# Patient Record
Sex: MALE | Race: WHITE
[De-identification: names, ages, dates, MRNs, and addresses within clinical notes are randomized per-mention and may not be internally consistent; named-entity substitution may affect disease eponyms.]

---

## 2017-05-06 ENCOUNTER — HOSPITAL ENCOUNTER (EMERGENCY)
Dept: HOSPITAL 25 - ED | Age: 27
Discharge: LEFT BEFORE DISCHARGE | End: 2017-05-06
Payer: COMMERCIAL

## 2017-05-06 VITALS — SYSTOLIC BLOOD PRESSURE: 145 MMHG | DIASTOLIC BLOOD PRESSURE: 80 MMHG

## 2017-05-06 DIAGNOSIS — R10.9: Primary | ICD-10-CM

## 2017-05-06 DIAGNOSIS — Z53.21: ICD-10-CM

## 2017-05-23 ENCOUNTER — HOSPITAL ENCOUNTER (EMERGENCY)
Dept: HOSPITAL 25 - UCEAST | Age: 27
Discharge: HOME | End: 2017-05-23
Payer: SELF-PAY

## 2017-05-23 VITALS — SYSTOLIC BLOOD PRESSURE: 136 MMHG | DIASTOLIC BLOOD PRESSURE: 70 MMHG

## 2017-05-23 DIAGNOSIS — Z88.2: ICD-10-CM

## 2017-05-23 DIAGNOSIS — S90.511A: Primary | ICD-10-CM
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
## 2017-05-23 DIAGNOSIS — E07.9: ICD-10-CM

## 2017-05-23 PROCEDURE — 99211 OFF/OP EST MAY X REQ PHY/QHP: CPT

## 2017-05-23 PROCEDURE — G0463 HOSPITAL OUTPT CLINIC VISIT: HCPCS

## 2017-05-23 NOTE — UC
Lower Extremity/Ankle HPI





- HPI Summary


HPI Summary: 


28 y/o male presents to the urgent care  c/o injury to R ankle.  Pt states he 

was at work today at [employer] and was kicked  by child with the child RT heel 

around 2:10pm, age 7, approx 130lbs, pt states.  Pt states when rotates inward, 

or when putting weight on his Pain is 4/10. Pt applied ice, but no took no pain 

medication.Patient denies any fever.


[ End ]





- History of Current Complaint


Chief Complaint: UCTrauma


Stated Complaint: ANKLE INJURY


Time Seen by Provider: 05/23/17 16:11


Onset/Duration: Sudden Onset


Severity Initially: Moderate


Severity Currently: Mild


Pain Intensity: 4


Aggravating Factor(s): Standing


Alleviating Factor(s): Rest, Ice


Related History: Occupational Injury





- Allergies/Home Medications


Allergies/Adverse Reactions: 


 Allergies











Allergy/AdvReac Type Severity Reaction Status Date / Time


 


Sulfa Drugs Allergy Severe Swelling Verified 05/23/17 15:42














PMH/Surg Hx/FS Hx/Imm Hx


Endocrine History Of: Reports: Thyroid Disease


   Denies: Diabetes, Hyperthyroidism, Hypothyroidism, Dyslipidemia


Cardiovascular History Of: 


   Denies: Cardiac Disorders, Hypertension, Pacemaker/ICD, Myocardial Infarction

, Congestive Heart Failure, Atrial Fibrillation, Deep Vein Thrombosis, Bleeding 

Disorders


Respiratory History Of: 


   Denies: COPD, Asthma, Bronchitis, Pneumonia, Pulmonary Embolism


GI/ History Of: 


   Denies: Gastroesophageal Reflux, Ulcer, Gastrointestinal Bleed, Gall Bladder 

Disease, Kidney Stones, Diverticulitis, Renal Disease, Urosepsis


Neurological History Of: 


   Denies: TIA, CVA, Dementia, Seizures, Migraine


Psychological History Of: 


   Denies: Anxiety, Depression, Bipolar Disorder, Schizophrenia, Post Traumatic 

Stress Disorder


Cancer History Of: 


   Denies: Lung Cancer, Colorectal Cancer, Breast Cancer, Prostate Cancer, 

Cervical Cancer


Other History Of: 


   Negative For: HIV, Hepatitis B, Hepatitis C





- Surgical History


Surgical History: Yes


Surgery Procedure, Year, and Place: CHOLECYSTECTOMY-[facility].  RIGHT SHOULDER SURGERY

- [facility].  LEFT SHOULDER SURGERY- [facility].  LT WRIST SURGERY





- Family History


Known Family History: Positive: Renal Disease - Stones in mother.


   Negative: Cardiac Disease, Hypertension





- Social History


Alcohol Use: Rare


Substance Use Type: None


Smoking Status (MU): Never Smoked Tobacco


Have You Smoked in the Last Year: No





- Immunization History


Most Recent Influenza Vaccination: none


Most Recent Tetanus Shot: UTD





Review of Systems


Constitutional: Negative


Skin: Bruising - scratch above the dorsal side of the RT ankle


Eyes: Negative


ENT: Negative


Respiratory: Negative


Cardiovascular: Negative


Gastrointestinal: Negative


Genitourinary: Negative


Musculoskeletal: Decreased ROM - mild decrease ROM of the RT ankle


Neurological: Negative


Psychological: Negative


All Other Systems Reviewed And Are Negative: Yes





Physical Exam


Triage Information Reviewed: Yes


Appearance: Well-Appearing, No Pain Distress, Well-Nourished


Vital Signs: 


 Initial Vital Signs











Temp  98.4 F   05/23/17 15:43


 


Pulse  76   05/23/17 15:43


 


Resp  18   05/23/17 15:43


 


BP  136/70   05/23/17 15:43


 


Pulse Ox  99   05/23/17 15:43











Vital Signs Reviewed: Yes


Eye Exam: Normal


ENT Exam: Normal


Neck exam: Normal


Respiratory Exam: Normal


Respiratory: Positive: Normal breath sounds


Cardiovascular: Positive: RRR, Pulses Normal


Abdomen Description: Positive: Nontender, No Organomegaly


Musculoskeletal: Positive: Strength Intact, ROM Intact, Other: - RT ankle with 

a superficial abrasion above the ankle about 5 cm in lenth and 2cm in with. 

Mild erythema and mild swelling.  Mild tenderness on deep palpation.  Decrease 

ROM due to mild pain.  pulses intact.  positive sensation of foot and good 

capillary refill.


Neurological Exam: Normal


Psychological Exam: Normal


Skin Exam: Other - as above





Lower Extremity Course/Dx





- Course


Course Of Treatment: Superficial RT ankle abrassion.Worker's compensation 

report filled out for both patient and facility.  Patient advised to take 

ibuprofen 600mg PO prn, he has at home. To continue placing ice and elevated 

leg.





- Differential Dx/Diagnosis


Provider Diagnoses: Superficial skin abrassion above the Rt ankle.





Discharge





- Discharge Plan


Condition: Stable


Disposition: HOME


Patient Education Materials:  Abrasion (ED)


Referrals: 


Cailin Haines MD [Primary Care Provider] - 


Additional Instructions: 


please return to the urgent care is pain or swelling increases or unable to 

ambulate.  Take medication for pain and swelling as indicated.

## 2018-07-17 ENCOUNTER — HOSPITAL ENCOUNTER (EMERGENCY)
Dept: HOSPITAL 25 - UCEAST | Age: 28
Discharge: HOME | End: 2018-07-17
Payer: COMMERCIAL

## 2018-07-17 VITALS — SYSTOLIC BLOOD PRESSURE: 116 MMHG | DIASTOLIC BLOOD PRESSURE: 70 MMHG

## 2018-07-17 DIAGNOSIS — H66.92: Primary | ICD-10-CM

## 2018-07-17 DIAGNOSIS — Z88.2: ICD-10-CM

## 2018-07-17 DIAGNOSIS — H92.02: ICD-10-CM

## 2018-07-17 DIAGNOSIS — R09.81: ICD-10-CM

## 2018-07-17 PROCEDURE — G0463 HOSPITAL OUTPT CLINIC VISIT: HCPCS

## 2018-07-17 PROCEDURE — 99212 OFFICE O/P EST SF 10 MIN: CPT

## 2018-07-17 NOTE — UC
Ear Complaint HPI





- HPI Summary


HPI Summary: 





29 y/o male presents to the urgent care c/o left ear pain since Sunday morning 7

/15/2018. Pt report he had nasal congestion w/ clear discharge last week and 

then he started to feel his ear pain w/ mild subjective fever. Now pain is 

radiating to his throat w/ an swollen lymph node that is tender. Pt Took 

Ibuprofen PO 600mg to alleviate symptoms.  He feels fatigue and mild HA. Pain 

is 3/10 now. Pt denies dizziness, SOB, cough. chest pain, abdominal pain, N/V/D.








- History of Current Complaint


Chief Complaint: UCRespiratory


Stated Complaint: SORE THROAT,EAR PAIN


Time Seen by Provider: 07/17/18 11:25


Hx Obtained From: Patient


Onset/Duration: Gradual Onset, Lasting Days - 3 days, Still Present, Worse 

Since - last night


Severity Initially: Mild


Severity Currently: Moderate


Pain Intensity: 3


Pain Scale Used: 0-10 Numeric


Aggravating Factors: Nothing


Alleviating Factors: OTC Meds


Associated Signs/Symptoms: Positive: URI Symptoms.  Negative: Discharge, 

Hearing Loss





- Allergies/Home Medications


Allergies/Adverse Reactions: 


 Allergies











Allergy/AdvReac Type Severity Reaction Status Date / Time


 


Sulfa (Sulfonamide Allergy  Swelling Verified 04/10/18 13:17





Antibiotics)     














PMH/Surg Hx/FS Hx/Imm Hx


Previously Healthy: Yes


Endocrine History: Hypothyroidism - hashimotos thyroditis


Other History Of: 


   Negative For: HIV, Hepatitis B, Hepatitis C





- Surgical History


Surgical History: Yes


Surgery Procedure, Year, and Place: CHOLECYSTECTOMY-[facility].  RIGHT SHOULDER SURGERY

- [facility].  LEFT SHOULDER SURGERY- [facility].  LT WRIST SURGERY





- Family History


Known Family History: Positive: Renal Disease - Stones in mother.


   Negative: Cardiac Disease, Hypertension


Family History: Hypothyrodism





- Social History


Occupation: Employed Full-time


Lives: With Family


Alcohol Use: Rare


Substance Use Type: None


Smoking Status (MU): Never Smoked Tobacco


Have You Smoked in the Last Year: No





- Immunization History


Most Recent Influenza Vaccination: none


Most Recent Tetanus Shot: UTD





Review of Systems


Constitutional: Negative


Skin: Negative


Eyes: Negative


ENT: Sore Throat - left side, Ear Ache - left ear pain, Nasal Discharge - clear


Respiratory: Negative


Cardiovascular: Negative


Gastrointestinal: Negative


Genitourinary: Negative


Motor: Negative


Neurovascular: Negative


Musculoskeletal: Negative


Neurological: Headache


Psychological: Negative


Is Patient Immunocompromised?: No


All Other Systems Reviewed And Are Negative: Yes





Physical Exam





- Summary


Physical Exam Summary: 





Vital signs: reviewed


General: well developed, well nourished male sitting in the examining table w/o 

any apparent distress


Skin: Pink, warm and dry, no evidence of atopic dermatitis, psoriasis, 

seborrhea.


HEENT:


-Head: atraumatic, non tender; no scalp dermatitis.


-Eyes: sclera and conjunctiva clear, PERRLA, EOMI


-Ears: no pre- or postauricular lymphadenopathy, positive left anterior 

cervical lymphadenopathy; LF external ear canal is clear, LF TM injected w/ 

erythema, no light reflex and yellowis discharge. Rt TM WNL, LF external ear 

canal clear and LF TM WNL. TMs normal w/out bulging or retraction. Good light 

reflex. No fluid level,. No perforation.


-Nose/Face: erythematous and edematous nasal mucosa with clear rhinorrhea, no 

frontal or maxillary sinus tender to palpation.


-Mouth/Throat: Mucous membrane moist, posterior pharynx clear, no erythema or 

exudates.


Neck: supple, FROM, nontender, no lymphadenopathy, no meningismus.


Chest: Clear to auscultation, normal breath sounds


Abd: soft, Bowel sounds active, Nontender.


Back: no spinal or CVAT


Neuro: A&O x4, GCS 15, no focal neuro deficits, normal behavior for age.








Triage Information Reviewed: Yes


Vital Signs: 


 Initial Vital Signs











Temp  98.5 F   07/17/18 10:57


 


Pulse  52   07/17/18 10:57


 


Resp  16   07/17/18 10:57


 


BP  116/70   07/17/18 10:57


 


Pulse Ox  98   07/17/18 10:57














Ear Complaint Course/Dx





- Course


Course Of Treatment: 29 y/o male presents to the urgent care c/o left ear pain 

since Sunday morning 7/15/2018. Pt report he had nasal congestion w/ clear 

discharge last week and then he started to feel his ear pain w/ mild subjective 

fever. Now pain is radiating to his throat w/ an swollen lymph node that is 

tender. Pt Took Ibuprofen PO 600mg to alleviate symptoms.  He feels fatigue and 

mild HA. Pain is 3/10 now. Pt denies dizziness, SOB, cough. chest pain, 

abdominal pain, N/V/D.Hx obtained. Pt w/ left otitis media on examination. Pt 

Rx Amoxicillin PO. Pt Advised to continue w/ Ibuprofen for otalgia and fever. 

Also advised   if symptoms do not improve or worsen to return to the urgent 

care or f/u with PCP  for further management. Mother  understood and agreed 

with D/C  instructions.





- Differential Dx/Diagnosis


Differential Diagnosis/HQI/PQRI: Cerumen Impaction, Otitis Externa, Perforated 

TM


Provider Diagnoses: 1- Left otitis media.  2- Otalgia





Discharge





- Sign-Out/Discharge


Documenting (check all that apply): Patient Departure - D/c home





- Discharge Plan


Condition: Stable


Disposition: HOME


Prescriptions: 


Amoxicillin PO (*) [Amoxicillin 875 MG (*)] 875 mg PO BID #14 tab


Patient Education Materials:  Ear Infection (ED)


Forms:  *Work Release


Referrals: 


Cailin Haines MD [Primary Care Provider] - 


Additional Instructions: 


1- Please take the full course of the antibiotic to avoid resistance.


2-Please take ibuprofen PO q6-8hrs prn as instructed after meals to alleviate 

pain and swelling. Increase fluid intake, eat well, rest and avoid strenuous 

exercise


3-If symptoms do not improve or worsen please return to the urgent care or f/u 

with your PCP 3 days  for further evaluation and treatment.











- Billing Disposition and Condition


Condition: STABLE


Disposition: Home

## 2019-03-04 ENCOUNTER — HOSPITAL ENCOUNTER (EMERGENCY)
Dept: HOSPITAL 25 - UCEAST | Age: 29
Discharge: HOME | End: 2019-03-04
Payer: COMMERCIAL

## 2019-03-04 VITALS — DIASTOLIC BLOOD PRESSURE: 78 MMHG | SYSTOLIC BLOOD PRESSURE: 135 MMHG

## 2019-03-04 DIAGNOSIS — L02.412: Primary | ICD-10-CM

## 2019-03-04 DIAGNOSIS — Z86.14: ICD-10-CM

## 2019-03-04 PROCEDURE — 87205 SMEAR GRAM STAIN: CPT

## 2019-03-04 PROCEDURE — 87070 CULTURE OTHR SPECIMN AEROBIC: CPT

## 2019-03-04 PROCEDURE — 87641 MR-STAPH DNA AMP PROBE: CPT

## 2019-03-04 PROCEDURE — 10060 I&D ABSCESS SIMPLE/SINGLE: CPT

## 2019-03-04 PROCEDURE — 87077 CULTURE AEROBIC IDENTIFY: CPT

## 2019-03-04 PROCEDURE — 87640 STAPH A DNA AMP PROBE: CPT

## 2019-03-04 PROCEDURE — 87186 SC STD MICRODIL/AGAR DIL: CPT

## 2019-03-04 PROCEDURE — G0463 HOSPITAL OUTPT CLINIC VISIT: HCPCS

## 2019-03-04 PROCEDURE — 99212 OFFICE O/P EST SF 10 MIN: CPT

## 2019-03-04 NOTE — UC
Skin Complaint HPI





- HPI Summary


HPI Summary: 





29 y/o male presents to the urgent care c/o intermittent  painful lump in the 

left axilla for the past 2 weeks. Pt reports it has worsen for the past 2 days 

now with a red rash around it. He has applied warm compresses. Pain is 5/10, at 

touch. He denies any drainage and fever. Also denies Hx of MRSA. Pt can move 

his left shoulder w/o any difficulty. Pt denies SOB, chest pain, palpitations, 

abdominal pain, N/V/D. Pt Has been healthy.  








- History of Current Complaint


Chief Complaint: UCSkin


Time Seen by Provider: 03/04/19 14:17


Stated Complaint: UNDER ARM PAIN


Hx Obtained From: Patient


Onset/Duration: Gradual Onset, Lasting Weeks - 2 weeks, Still Present, Worse 

Since - 2 days


Skin Exposure Onset/Duration: Days Ago - 14 days


Timing: Constant


Onset Severity: Mild


Current Severity: Moderate


Pain Intensity: 5


Pain Scale Used: 0-10 Numeric


Location: Discrete - left axilla w/ with a  red rash around


Character: Swelling, Redness, Raised, Painful


Aggravating Factor(s): Touch


Alleviating Factor(s): Heat, OTC Meds


Associated Signs & Symptoms: Positive: Rash - left axilla painful lump, 

Tenderness.  Negative: Numbness, Fever, Chills, Drainage





- Allergy/Home Medications


Allergies/Adverse Reactions: 


 Allergies











Allergy/AdvReac Type Severity Reaction Status Date / Time


 


Sulfa (Sulfonamide Allergy  Swelling Verified 03/04/19 14:10





Antibiotics)     














PMH/Surg Hx/FS Hx/Imm Hx


Previously Healthy: Yes - Pt denies PMHX


Other History Of: 


   Negative For: HIV, Hepatitis B, Hepatitis C





- Surgical History


Surgical History: Yes


Surgery Procedure, Year, and Place: CHOLECYSTECTOMY-[facility].  RIGHT SHOULDER SURGERY

- [facility].  LEFT SHOULDER SURGERY- [facility].  LT WRIST SURGERY





- Family History


Known Family History: Positive: Hypertension, Renal Disease - Stones in mother.


   Negative: Cardiac Disease


Family History: Hypothyrodism





- Social History


Occupation: Employed Full-time


Lives: With Family


Alcohol Use: Rare


Substance Use Type: None


Smoking Status (MU): Never Smoked Tobacco


Have You Smoked in the Last Year: No





- Immunization History


Most Recent Influenza Vaccination: none


Most Recent Tetanus Shot: UTD





Review of Systems


All Other Systems Reviewed And Are Negative: Yes


Constitutional: Positive: Negative


Skin: Positive: Other - painful lump in the left axilla for the past 2 weeks


Eyes: Positive: Negative


ENT: Positive: Negative


Respiratory: Positive: Negative


Cardiovascular: Positive: Negative


Gastrointestinal: Positive: Negative


Genitourinary: Positive: Negative


Motor: Positive: Negative


Neurovascular: Positive: Negative


Musculoskeletal: Positive: Negative


Neurological: Positive: Negative


Psychological: Positive: Negative


Is Patient Immunocompromised?: No





Physical Exam





- Summary


Physical Exam Summary: 





Vital Signs Reviewed: Yes


General: well developed, well nourished male sitting in the examining table w/o 

any apparent distress


Eye Exam: Normal


Eyes: Positive: Conjunctiva Clear - PERRLA, EOMI, fundi grossly normal


ENT: Positive: Normal ENT inspection, Hearing grossly normal, Pharynx normal, 

TMs normal


Neck: Positive: Supple, Nontender, No Lymphadenopathy


Respiratory: Positive: Chest non-tender, Lungs clear, Normal breath sounds, No 

respiratory distress


Cardiovascular: Positive: RRR, No Murmur, Pulses Normal, Brisk Capillary Refill


Abdomen Description: Positive: Nontender, No Organomegaly, Soft. Negative: CVA 

Tenderness (R), CVA Tenderness (L)


Bowel Sounds: Positive: Present


Musculoskeletal: Positive: Strength Intact, ROM Intact, No Edema


Neurological: Positive: Alert, Muscle Tone Normal


Psychological Exam: Normal


Skin: Positive:  Left axilla with a small erythematous pustule that is 

indurated and fluctuant, tender to palpation, swollen, and warm to touch about 

2.0 x 2.0cm in size. FROM of phalanx, sensation is intact, capillary refill WNL

, reflexes WNL











Triage Information Reviewed: Yes


Vital Signs: 


 Initial Vital Signs











Temp  98.9 F   03/04/19 14:10


 


Pulse  71   03/04/19 14:10


 


Resp  16   03/04/19 14:10


 


BP  135/78   03/04/19 14:10


 


Pulse Ox  98   03/04/19 14:10














Course/Dx





- Course


Course Of Treatment: 29 y/o male presents to the urgent care c/o intermittent  

painful lump in the left axilla for the past 2 weeks. Pt reports it has worsen 

for the past 2 days now with a red rash around it. He has applied warm 

compresses. Pain is 5/10, at touch. He denies any drainage and fever. Also 

denies Hx of MRSA. Pt can move his left shoulder w/o any difficulty. Pt denies 

SOB, chest pain, palpitations, abdominal pain, N/V/D. Pt Has been healthy.  Pt w

/ a left axillary abscess about 2.0 x 2.0 cm in size, no drainage observed on 

examination. I&D of abscess procedure:The procedure was explained and consent 

obtained. Universal protocol performed.  The wound was anesthetized with 3mL of 

Lido 1% with good anesthesia.  Sterile drape and prep were done.  The fluctuant 

center was incised with #11 blade scalpel.  A moderate amount of bloody and 

yellowish material was expressed .  wound cultures obtained and sent to lab top 

r/o MRSA.  The wound was probed for loculated areas and irrigated with normal 

saline.  The wound was packed loosely with wick or left open.  Bacitracin 

topical ointment applied and wound covered with sterile dressing.  The patient 

tolerated the procedure well.  Pt Rx Keflex PO and Bacitrain oint. Advised to 

take ibuprofen PO for pain. Advised to return to the urgent care for wound 

check up. Pt advised  fever develops and pain increase despite ABX to go 

immediately to the ER for further management. Pt understood and agreed with D/C 

instructions. Left the clinic ambulating A&OX3.





- Differential Diagnoses - Skin Complaint


Differential Diagnoses: Abscess, Contact Dermatitis, Local Allergic Reaction, 

MRSA, Tinea





- Diagnoses


Provider Diagnosis: 


 Abscess of left axilla








Discharge





- Sign-Out/Discharge


Documenting (check all that apply): Patient Departure - D/C home


All imaging exams completed and their final reports reviewed: No Studies





- Discharge Plan


Condition: Stable


Disposition: HOME


Prescriptions: 


Bacitracin OINTMENT* 1 applic TOPICAL BID #1 tube


Cephalexin CAP* [Keflex CAP*] 500 mg PO QID #28 cap


Patient Education Materials:  Abscess (ED)


Forms:  *Work Release


Referrals: 


Cailin Haines MD [Primary Care Provider] - 


Additional Instructions: 


1-Please take full course of antibiotic to avoid resistance. Keep wound clean 

and dry with a sterile dressing. Apply bacitracin topical as directed


2- F/u wound check up in 2 days with your PCP or at the urgent care for removal 

of packing.


3-. Take Ibuprofen PO q6-8hrs prn  after meals for pain or swelling. 


4-If you develop fever or redness despite antibiotic please go to the ER 

immediately or return to the Urgent care.


5- Wound culture sent to lab, if any abnormal result you will receive a call 

from us.














- Billing Disposition and Condition


Condition: STABLE


Disposition: Home

## 2019-03-06 ENCOUNTER — HOSPITAL ENCOUNTER (EMERGENCY)
Dept: HOSPITAL 25 - UCEAST | Age: 29
Discharge: HOME | End: 2019-03-06
Payer: COMMERCIAL

## 2019-03-06 VITALS — SYSTOLIC BLOOD PRESSURE: 147 MMHG | DIASTOLIC BLOOD PRESSURE: 86 MMHG

## 2019-03-06 DIAGNOSIS — Z48.00: Primary | ICD-10-CM

## 2019-03-06 DIAGNOSIS — L02.412: ICD-10-CM

## 2019-03-06 PROCEDURE — 99211 OFF/OP EST MAY X REQ PHY/QHP: CPT

## 2019-03-06 PROCEDURE — G0463 HOSPITAL OUTPT CLINIC VISIT: HCPCS

## 2019-03-06 NOTE — ED
Skin Complaint





- HPI Summary


HPI Summary: 





27 yo WM here for left axilla abscess wound check and packing removal. No f/c/

pain





- History of Current Complaint


Chief Complaint: UCSkin


Time Seen by Provider: 03/06/19 15:16


Stated Complaint: CHANGE DRESSING


Hx Obtained From: Patient


Onset/Duration: Started Days Ago


Timing: Constant


Onset Severity: Moderate


Current Severity: Moderate


Pain Intensity: 0





- Allergy/Home Medications


Allergies/Adverse Reactions: 


 Allergies











Allergy/AdvReac Type Severity Reaction Status Date / Time


 


Sulfa (Sulfonamide Allergy  Swelling Verified 03/06/19 15:08





Antibiotics)     














PMH/Surg Hx/FS Hx/Imm Hx


Endocrine/Hematology History: Reports: Hx Thyroid Disease


   Denies: Hx Diabetes


Cardiovascular History: 


   Denies: Hx Congestive Heart Failure, Hx Deep Vein Thrombosis, Hx Hypertension

, Hx Myocardial Infarction, Hx Pacemaker/ICD, Other Cardiovascular Problems/

Disorders


Respiratory History: 


   Denies: Hx Asthma, Hx Chronic Obstructive Pulmonary Disease (COPD), Hx Lung 

Cancer, Hx Pneumonia, Hx Pulmonary Embolism, Other Respiratory Problems/

Disorders


GI History: 


   Denies: Hx Gall Bladder Disease, Hx Gastrointestinal Bleed, Hx Ulcer, Hx 

Urosepsis


 History: 


   Denies: Hx Kidney Stones, Hx Renal Disease


Sensory History: Reports: Hx Contacts or Glasses - INSTRUCTS GIVEN


   Denies: Hx Hearing Aid


Opthamlomology History: Reports: Hx Contacts or Glasses - INSTRUCTS GIVEN


Neurological History: 


   Denies: Hx Dementia, Hx Migraine, Hx Seizures, Hx Transient Ischemic Attacks 

(TIA)


Psychiatric History: 


   Denies: Hx Anxiety, Hx Depression, Hx Schizophrenia, Hx Bipolar Disorder





- Surgical History


Surgery Procedure, Year, and Place: CHOLECYSTECTOMY-[facility].  RIGHT SHOULDER SURGERY

- [facility].  LEFT SHOULDER SURGERY- [facility].  LT WRIST SURGERY


Hx Anesthesia Reactions: No





- Immunization History


Date of Tetanus Vaccine: Up to date


Date of Influenza Vaccine: 2013


Infectious Disease History: No


Infectious Disease History: 


   Denies: Hx Clostridium Difficile, Hx Hepatitis, Hx Human Immunodeficiency 

Virus (HIV), Hx of Known/Suspected MRSA, Hx Shingles, Hx Tuberculosis, Hx Known/

Suspected VRE, Hx Known/Suspected VRSA, History Other Infectious Disease, 

Traveled Outside the [place] in Last 30 Days





- Family History


Known Family History: Positive: Hypertension, Renal Disease - Stones in mother.


   Negative: Cardiac Disease


Family History: Hypothyrodism





- Social History


Alcohol Use: Rare


Substance Use Type: Reports: None


Hx Tobacco Use: No


Smoking Status (MU): Never Smoked Tobacco


Have You Smoked in the Last Year: No





Review of Systems


Constitutional: Negative


Eyes: Negative


ENT: Negative


Cardiovascular: Negative


Respiratory: Negative


Gastrointestinal: Negative


Musculoskeletal: Negative


Positive: Other - left axilla abscess


Psychological: Normal


All Other Systems Reviewed And Are Negative: Yes





Physical Exam





- Summary


Physical Exam Summary: 


Vital Signs Reviewed: Yes


Skin: Positive: Warm


Head/Face: Positive: Normal Head/Face Inspection


Eyes: Positive: Normal


ENT: Positive: Normal ENT inspection


Neck: Positive: Supple


Respiratory/Lung Sounds: Positive: Clear to Auscultation


Cardiovascular: Positive: Normal, RRR, S1, S2


Abdomen Description: Positive: Nontender


Musculoskeletal: Positive: Normal


Neurological: Positive: Normal


Psychiatric: Positive: Normal, Affect/Mood Appropriate





Triage Information Reviewed: Yes


Vital Signs On Initial Exam: 


 Initial Vitals











Temp Pulse Resp BP Pulse Ox


 


 36.4 C   73   18   147/86   97 


 


 03/06/19 15:06  03/06/19 15:06  03/06/19 15:06  03/06/19 15:06  03/06/19 15:06











Skin: Positive: Warm, Other - skin- abscess draining well, decompressed swelling





Diagnostics





- Vital Signs


 Vital Signs











  Temp Pulse Resp BP Pulse Ox


 


 03/06/19 15:06  36.4 C  73  18  147/86  97














- Laboratory


Lab Statement: Any lab studies that have been ordered have been reviewed, and 

results considered in the medical decision making process.





Course/Dx





- Course


Assessment/Plan: left axillary abscess- packing removed, healing and draining 

well, woundcare instructions given, wash with soap and water RTC if swelling, 

redness or tenderness increases.





- Diagnoses


Provider Diagnoses: 


 Abscess of axilla, left, Wound check, abscess








Discharge





- Sign-Out/Discharge


Documenting (check all that apply): Patient Departure


All imaging exams completed and their final reports reviewed: No Studies





- Discharge Plan


Condition: Stable


Disposition: HOME


Patient Education Materials:  Abscess (ED)


Referrals: 


Cailin Haines MD [Primary Care Provider] - 





- Billing Disposition and Condition


Condition: STABLE


Disposition: Home

## 2019-04-29 ENCOUNTER — HOSPITAL ENCOUNTER (EMERGENCY)
Dept: HOSPITAL 25 - UCEAST | Age: 29
Discharge: HOME | End: 2019-04-29
Payer: COMMERCIAL

## 2019-04-29 VITALS — SYSTOLIC BLOOD PRESSURE: 142 MMHG | DIASTOLIC BLOOD PRESSURE: 86 MMHG

## 2019-04-29 DIAGNOSIS — Z23: ICD-10-CM

## 2019-04-29 DIAGNOSIS — S60.511A: ICD-10-CM
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
## 2019-04-29 DIAGNOSIS — Z88.2: ICD-10-CM

## 2019-04-29 DIAGNOSIS — S51.832A: Primary | ICD-10-CM
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
## 2019-04-29 DIAGNOSIS — S51.831A: ICD-10-CM

## 2019-04-29 DIAGNOSIS — S60.512A: ICD-10-CM

## 2019-04-29 DIAGNOSIS — S50.12XA: ICD-10-CM

## 2019-04-29 LAB
ALBUMIN SERPL BCG-MCNC: 4.8 G/DL (ref 3.2–5.2)
ALBUMIN/GLOB SERPL: 1.6 {RATIO} (ref 1–3)
ALP SERPL-CCNC: 87 U/L (ref 34–104)
ALT SERPL W P-5'-P-CCNC: 55 U/L (ref 7–52)
ANION GAP SERPL CALC-SCNC: 9 MMOL/L (ref 2–11)
AST SERPL-CCNC: 34 U/L (ref 13–39)
BUN SERPL-MCNC: 13 MG/DL (ref 6–24)
BUN/CREAT SERPL: 15.5 (ref 8–20)
CALCIUM SERPL-MCNC: 9.4 MG/DL (ref 8.6–10.3)
CHLORIDE SERPL-SCNC: 105 MMOL/L (ref 101–111)
GLOBULIN SER CALC-MCNC: 3 G/DL (ref 2–4)
GLUCOSE SERPL-MCNC: 91 MG/DL (ref 70–100)
HCO3 SERPL-SCNC: 24 MMOL/L (ref 22–32)
HCT VFR BLD AUTO: 44 % (ref 36–46)
HGB BLD-MCNC: 15.4 G/DL (ref 14–18)
MCH RBC QN AUTO: 30 PG (ref 27–31)
MCHC RBC AUTO-ENTMCNC: 35 G/DL (ref 31–36)
MCV RBC AUTO: 87 FL (ref 80–94)
PLATELET # BLD AUTO: 351 10^3/UL (ref 150–450)
POTASSIUM SERPL-SCNC: 4.2 MMOL/L (ref 3.5–5)
PROT SERPL-MCNC: 7.8 G/DL (ref 6.4–8.9)
RBC # BLD AUTO: 5.09 10^6 /UL (ref 4.18–5.48)
SODIUM SERPL-SCNC: 138 MMOL/L (ref 135–145)
WBC # BLD AUTO: 8.7 10^3/UL (ref 3.5–10.8)

## 2019-04-29 PROCEDURE — 90715 TDAP VACCINE 7 YRS/> IM: CPT

## 2019-04-29 PROCEDURE — 85027 COMPLETE CBC AUTOMATED: CPT

## 2019-04-29 PROCEDURE — 90472 IMMUNIZATION ADMIN EACH ADD: CPT

## 2019-04-29 PROCEDURE — 80074 ACUTE HEPATITIS PANEL: CPT

## 2019-04-29 PROCEDURE — 99213 OFFICE O/P EST LOW 20 MIN: CPT

## 2019-04-29 PROCEDURE — 80053 COMPREHEN METABOLIC PANEL: CPT

## 2019-04-29 PROCEDURE — 36415 COLL VENOUS BLD VENIPUNCTURE: CPT

## 2019-04-29 PROCEDURE — G0463 HOSPITAL OUTPT CLINIC VISIT: HCPCS

## 2019-04-29 PROCEDURE — 86703 HIV-1/HIV-2 1 RESULT ANTBDY: CPT
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
## 2019-11-06 ENCOUNTER — HOSPITAL ENCOUNTER (EMERGENCY)
Dept: HOSPITAL 25 - UCEAST | Age: 29
Discharge: HOME | End: 2019-11-06
Payer: COMMERCIAL

## 2019-11-06 VITALS — SYSTOLIC BLOOD PRESSURE: 123 MMHG | DIASTOLIC BLOOD PRESSURE: 78 MMHG

## 2019-11-06 DIAGNOSIS — Z88.2: ICD-10-CM
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
## 2019-11-06 DIAGNOSIS — S69.92XA: Primary | ICD-10-CM
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
## 2019-11-06 PROCEDURE — 99211 OFF/OP EST MAY X REQ PHY/QHP: CPT

## 2019-11-06 PROCEDURE — G0463 HOSPITAL OUTPT CLINIC VISIT: HCPCS
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
## 2020-01-27 ENCOUNTER — HOSPITAL ENCOUNTER (EMERGENCY)
Dept: HOSPITAL 25 - ED | Age: 30
Discharge: HOME | End: 2020-01-27
Payer: COMMERCIAL

## 2020-01-27 VITALS — SYSTOLIC BLOOD PRESSURE: 131 MMHG | DIASTOLIC BLOOD PRESSURE: 64 MMHG

## 2020-01-27 DIAGNOSIS — R07.9: Primary | ICD-10-CM

## 2020-01-27 DIAGNOSIS — Z90.49: ICD-10-CM

## 2020-01-27 DIAGNOSIS — Z88.2: ICD-10-CM

## 2020-01-27 DIAGNOSIS — E07.9: ICD-10-CM

## 2020-01-27 LAB
ALBUMIN SERPL BCG-MCNC: 4.5 G/DL (ref 3.2–5.2)
ALBUMIN/GLOB SERPL: 1.5 {RATIO} (ref 1–3)
ALP SERPL-CCNC: 81 U/L (ref 34–104)
ALT SERPL W P-5'-P-CCNC: 29 U/L (ref 7–52)
ANION GAP SERPL CALC-SCNC: 5 MMOL/L (ref 2–11)
AST SERPL-CCNC: 18 U/L (ref 13–39)
BASOPHILS # BLD AUTO: 0 10^3/UL (ref 0–0.2)
BUN SERPL-MCNC: 15 MG/DL (ref 6–24)
BUN/CREAT SERPL: 17.2 (ref 8–20)
CALCIUM SERPL-MCNC: 9 MG/DL (ref 8.6–10.3)
CHLORIDE SERPL-SCNC: 105 MMOL/L (ref 101–111)
EOSINOPHIL # BLD AUTO: 0.3 10^3/UL (ref 0–0.6)
GLOBULIN SER CALC-MCNC: 3.1 G/DL (ref 2–4)
GLUCOSE SERPL-MCNC: 83 MG/DL (ref 70–100)
HCO3 SERPL-SCNC: 27 MMOL/L (ref 22–32)
HCT VFR BLD AUTO: 42 % (ref 42–52)
HGB BLD-MCNC: 14.8 G/DL (ref 14–18)
LYMPHOCYTES # BLD AUTO: 1.7 10^3/UL (ref 1–4.8)
MCH RBC QN AUTO: 31 PG (ref 27–31)
MCHC RBC AUTO-ENTMCNC: 35 G/DL (ref 31–36)
MCV RBC AUTO: 87 FL (ref 80–94)
MONOCYTES # BLD AUTO: 0.6 10^3/UL (ref 0–0.8)
NEUTROPHILS # BLD AUTO: 3.5 10^3/UL (ref 1.5–7.7)
NRBC # BLD AUTO: 0 10^3/UL
NRBC BLD QL AUTO: 0
PLATELET # BLD AUTO: 274 10^3/UL (ref 150–450)
POTASSIUM SERPL-SCNC: 4.2 MMOL/L (ref 3.5–5)
PROT SERPL-MCNC: 7.6 G/DL (ref 6.4–8.9)
RBC # BLD AUTO: 4.8 10^6 /UL (ref 4.18–5.48)
SODIUM SERPL-SCNC: 137 MMOL/L (ref 135–145)
TROPONIN I SERPL-MCNC: 0 NG/ML (ref ?–0.03)
WBC # BLD AUTO: 6.1 10^3/UL (ref 3.5–10.8)

## 2020-01-27 PROCEDURE — 85025 COMPLETE CBC W/AUTO DIFF WBC: CPT

## 2020-01-27 PROCEDURE — 80053 COMPREHEN METABOLIC PANEL: CPT

## 2020-01-27 PROCEDURE — 84484 ASSAY OF TROPONIN QUANT: CPT

## 2020-01-27 PROCEDURE — 99282 EMERGENCY DEPT VISIT SF MDM: CPT

## 2020-01-27 PROCEDURE — 71046 X-RAY EXAM CHEST 2 VIEWS: CPT

## 2020-01-27 PROCEDURE — 93005 ELECTROCARDIOGRAM TRACING: CPT

## 2020-01-27 PROCEDURE — 36415 COLL VENOUS BLD VENIPUNCTURE: CPT

## 2020-01-27 NOTE — ED
HPI Chest Pain





- HPI Summary


HPI Summary: 





Patient complains of recurrent chest pain 2 years, with most recent episode 

lasting intermittently over 3 days with frequent palpitations.  States one 

episode of mild SOB when pain was at worst.  Pain rated 4/10 at worst.  2/10 

currently.  Symptoms are related to eating or exertion.  Random onset.  Last 

couple minutes at a time.  Diffuse chest pain.  Patient has been evaluated by 

sports medicine doctor Kathleen at [facility] with no formal diagnosis.  

Denies fever, cough, sore throat, N/V/D, abdominal pain, change in urine, 

change in BM. History of Hashimoto.





- History of Current Complaint


Chief Complaint: EDDysrhythmPalp


Time Seen by Provider: 01/27/20 15:44


Hx Obtained From: Patient


Onset/Duration: Started Days Ago


Timing: Intermittent, Lasting Minutes


Initial Severity: Moderate


Current Severity: Mild


Pain Intensity: 2


Pain Scale Used: 0-10 Numeric





- Allergy/Home Medications


Allergies/Adverse Reactions: 


 Allergies











Allergy/AdvReac Type Severity Reaction Status Date / Time


 


Sulfa (Sulfonamide Allergy  Swelling Verified 01/27/20 12:03





Antibiotics)     














PMH/Surg Hx/FS Hx/Imm Hx


Endocrine/Hematology History: Reports: Hx Thyroid Disease


   Denies: Hx Diabetes


Cardiovascular History: 


   Denies: Hx Congestive Heart Failure, Hx Deep Vein Thrombosis, Hx Hypertension

, Hx Myocardial Infarction, Hx Pacemaker/ICD, Other Cardiovascular Problems/

Disorders


Respiratory History: 


   Denies: Hx Asthma, Hx Chronic Obstructive Pulmonary Disease (COPD), Hx Lung 

Cancer, Hx Pneumonia, Hx Pulmonary Embolism, Other Respiratory Problems/

Disorders


GI History: 


   Denies: Hx Gall Bladder Disease, Hx Gastrointestinal Bleed, Hx Ulcer, Hx 

Urosepsis


 History: 


   Denies: Hx Kidney Stones, Hx Renal Disease


Sensory History: Reports: Hx Contacts or Glasses - INSTRUCTS GIVEN


   Denies: Hx Hearing Aid


Opthamlomology History: Reports: Hx Contacts or Glasses - INSTRUCTS GIVEN


Neurological History: 


   Denies: Hx Dementia, Hx Migraine, Hx Seizures, Hx Transient Ischemic Attacks 

(TIA)


Psychiatric History: 


   Denies: Hx Anxiety, Hx Depression, Hx Schizophrenia, Hx Bipolar Disorder





- Surgical History


Surgery Procedure, Year, and Place: CHOLECYSTECTOMY-[facility].  RIGHT SHOULDER SURGERY

- [facility].  LEFT SHOULDER SURGERY- [facility].  LT WRIST SURGERY


Hx Anesthesia Reactions: No





- Immunization History


Date of Tetanus Vaccine: Up to date


Date of Influenza Vaccine: 2013


Infectious Disease History: No


Infectious Disease History: 


   Denies: Hx Clostridium Difficile, Hx Hepatitis, Hx Human Immunodeficiency 

Virus (HIV), Hx of Known/Suspected MRSA, Hx Shingles, Hx Tuberculosis, Hx Known/

Suspected VRE, Hx Known/Suspected VRSA, History Other Infectious Disease, 

Traveled Outside the [place] in Last 30 Days





- Family History


Known Family History: Positive: Hypertension, Renal Disease - Stones in mother.


   Negative: Cardiac Disease


Family History: Hypothyrodism





- Social History


Alcohol Use: Occasionally


Substance Use Type: Reports: None


Hx Tobacco Use: No


Smoking Status (MU): Never Smoked Tobacco


Have You Smoked in the Last Year: No





Review of Systems


Constitutional: Negative


Eyes: Negative


ENT: Negative


Positive: Chest Pain


Positive: Shortness Of Breath


Gastrointestinal: Negative


Genitourinary: Negative


Musculoskeletal: Negative


Skin: Negative


Neurological: Negative


Psychological: Normal


All Other Systems Reviewed And Are Negative: Yes





Physical Exam





- Summary


Physical Exam Summary: 





Chest pain not reproducible.


Triage Information Reviewed: Yes


Vital Signs On Initial Exam: 


 Initial Vitals











Temp Pulse Resp BP Pulse Ox


 


 98.2 F   48   16   134/73   99 


 


 01/27/20 11:53  01/27/20 11:53  01/27/20 11:53  01/27/20 11:53  01/27/20 11:53











Vital Signs Reviewed: Yes


Appearance: Positive: Well-Appearing


Skin: Positive: Warm


Head/Face: Positive: Normal Head/Face Inspection


Eyes: Positive: Normal


Neck: Positive: Supple


Respiratory/Lung Sounds: Positive: Clear to Auscultation


Cardiovascular: Positive: Normal


Abdomen Description: Positive: Nontender


Musculoskeletal: Positive: Normal


Neurological: Positive: Normal


Psychiatric: Positive: Normal


AVPU Assessment: Alert





- [place] Coma Scale


Best Eye Response: 4 - Spontaneous


Best Motor Response: 6 - Obeys Commands


Best Verbal Response: 5 - Oriented


Coma Scale Total: 15





Procedures





- Sedation


Patient Received Moderate/Deep Sedation with Procedure: No





Diagnostics





- Vital Signs


 Vital Signs











  Temp Pulse Resp BP Pulse Ox


 


 01/27/20 14:56  97.8 F  48  19  136/66  99


 


 01/27/20 13:05  98.3 F  51  18  151/80  98


 


 01/27/20 11:53  98.2 F  48  16  134/73  99














- Laboratory


Lab Results: 


 Lab Results











  01/27/20 01/27/20 Range/Units





  12:39 12:39 


 


WBC  6.1   (3.5-10.8)  10^3/uL


 


RBC  4.80   (4.18-5.48)  10^6 /uL


 


Hgb  14.8   (14.0-18.0)  g/dL


 


Hct  42   (42-52)  %


 


MCV  87   (80-94)  fL


 


MCH  31   (27-31)  pg


 


MCHC  35   (31-36)  g/dL


 


RDW  13   (10-15)  %


 


Plt Count  274   (150-450)  10^3/uL


 


MPV  7.8   (7.4-10.4)  fL


 


Neut % (Auto)  56.9   %


 


Lymph % (Auto)  28.3   %


 


Mono % (Auto)  9.8   %


 


Eos % (Auto)  4.7   %


 


Baso % (Auto)  0.3   %


 


Absolute Neuts (auto)  3.5   (1.5-7.7)  10^3/ul


 


Absolute Lymphs (auto)  1.7   (1.0-4.8)  10^3/ul


 


Absolute Monos (auto)  0.6   (0-0.8)  10^3/ul


 


Absolute Eos (auto)  0.3   (0-0.6)  10^3/ul


 


Absolute Basos (auto)  0.0   (0-0.2)  10^3/ul


 


Absolute Nucleated RBC  0.0   10^3/ul


 


Nucleated RBC %  0.0   


 


Sodium   137  (135-145)  mmol/L


 


Potassium   4.2  (3.5-5.0)  mmol/L


 


Chloride   105  (101-111)  mmol/L


 


Carbon Dioxide   27  (22-32)  mmol/L


 


Anion Gap   5  (2-11)  mmol/L


 


BUN   15  (6-24)  mg/dL


 


Creatinine   0.87  (0.67-1.17)  mg/dL


 


Est GFR ( Amer)   124.7  (>60)  


 


Est GFR (Non-Af Amer)   103.0  (>60)  


 


BUN/Creatinine Ratio   17.2  (8-20)  


 


Glucose   83  ()  mg/dL


 


Calcium   9.0  (8.6-10.3)  mg/dL


 


Total Bilirubin   0.50  (0.2-1.0)  mg/dL


 


AST   18  (13-39)  U/L


 


ALT   29  (7-52)  U/L


 


Alkaline Phosphatase   81  ()  U/L


 


Troponin I   0.00  (<0.03)  ng/mL


 


Total Protein   7.6  (6.4-8.9)  g/dL


 


Albumin   4.5  (3.2-5.2)  g/dL


 


Globulin   3.1  (2-4)  g/dL


 


Albumin/Globulin Ratio   1.5  (1-3)  











Result Diagrams: 


 01/27/20 12:39





 01/27/20 12:39


Lab Statement: Any lab studies that have been ordered have been reviewed, and 

results considered in the medical decision making process.





Chest Pain Course/Dx





- Course


Course Of Treatment: Patient complains of recurrent chest pain 2 years, with 

most recent episode lasting intermittently over 3 days with frequent 

palpitations.  States one episode of mild SOB when pain was at worst.  Pain 

rated 4/10 at worst.  2/10 currently.  Symptoms are related to eating or 

exertion.  Random onset.  Last couple minutes at a time.  Diffuse chest pain.  

Patient has been evaluated by sports medicine doctor Kathleen at [facility] 

with no formal diagnosis.  Denies fever, cough, sore throat, N/V/D, abdominal 

pain, change in urine, change in BM. History of Hashimoto.  Vital signs within 

normal limits.  Labs unremarkable.  Chest x-ray unremarkable.  EKG sinus 

arrhythmia, heart rate 53.  Same as prior except for new T-wave inversion in V1.





- Diagnoses


Provider Diagnoses: 


 Chest pain








Discharge ED





- Sign-Out/Discharge


Documenting (check all that apply): Patient Departure





- Discharge Plan


Condition: Stable


Disposition: HOME


Patient Education Materials:  Chest Pain (ED)


Forms:  *Work Release


Referrals: 


Natty Luque DO [Primary Care Provider] - 


Javier Blanchard DO [Medical Doctor] - 


Additional Instructions: 


Follow-up with primary care and cardiology Dr. Blanchard for further evaluation 

with a possible Holter monitor.  Return to the ED for any new or worsening 

symptoms.





- Billing Disposition and Condition


Condition: STABLE


Disposition: Home